# Patient Record
Sex: FEMALE | Race: WHITE | NOT HISPANIC OR LATINO | Employment: FULL TIME | URBAN - NONMETROPOLITAN AREA
[De-identification: names, ages, dates, MRNs, and addresses within clinical notes are randomized per-mention and may not be internally consistent; named-entity substitution may affect disease eponyms.]

---

## 2024-06-06 ENCOUNTER — HOSPITAL ENCOUNTER (EMERGENCY)
Facility: HOSPITAL | Age: 34
Discharge: HOME | End: 2024-06-06
Attending: EMERGENCY MEDICINE
Payer: COMMERCIAL

## 2024-06-06 ENCOUNTER — APPOINTMENT (OUTPATIENT)
Dept: RADIOLOGY | Facility: HOSPITAL | Age: 34
End: 2024-06-06
Payer: COMMERCIAL

## 2024-06-06 ENCOUNTER — HOSPITAL ENCOUNTER (OUTPATIENT)
Dept: CARDIOLOGY | Facility: HOSPITAL | Age: 34
Discharge: HOME | End: 2024-06-06
Payer: COMMERCIAL

## 2024-06-06 VITALS
WEIGHT: 160 LBS | DIASTOLIC BLOOD PRESSURE: 74 MMHG | RESPIRATION RATE: 17 BRPM | BODY MASS INDEX: 22.9 KG/M2 | SYSTOLIC BLOOD PRESSURE: 114 MMHG | HEART RATE: 61 BPM | HEIGHT: 70 IN | OXYGEN SATURATION: 98 % | TEMPERATURE: 97.9 F

## 2024-06-06 DIAGNOSIS — R06.00 DYSPNEA, UNSPECIFIED TYPE: Primary | ICD-10-CM

## 2024-06-06 DIAGNOSIS — Z3A.09 9 WEEKS GESTATION OF PREGNANCY (HHS-HCC): ICD-10-CM

## 2024-06-06 LAB
ALBUMIN SERPL BCP-MCNC: 4.5 G/DL (ref 3.4–5)
ALP SERPL-CCNC: 46 U/L (ref 33–110)
ALT SERPL W P-5'-P-CCNC: 11 U/L (ref 7–45)
ANION GAP SERPL CALC-SCNC: 11 MMOL/L (ref 10–20)
AST SERPL W P-5'-P-CCNC: 11 U/L (ref 9–39)
BASOPHILS # BLD AUTO: 0.04 X10*3/UL (ref 0–0.1)
BASOPHILS NFR BLD AUTO: 0.4 %
BILIRUB SERPL-MCNC: 0.5 MG/DL (ref 0–1.2)
BUN SERPL-MCNC: 13 MG/DL (ref 6–23)
CALCIUM SERPL-MCNC: 9.1 MG/DL (ref 8.6–10.3)
CARDIAC TROPONIN I PNL SERPL HS: <3 NG/L (ref 0–13)
CARDIAC TROPONIN I PNL SERPL HS: <3 NG/L (ref 0–13)
CHLORIDE SERPL-SCNC: 102 MMOL/L (ref 98–107)
CO2 SERPL-SCNC: 25 MMOL/L (ref 21–32)
CREAT SERPL-MCNC: 0.71 MG/DL (ref 0.5–1.05)
D DIMER PPP FEU-MCNC: 1497 NG/ML FEU
EGFRCR SERPLBLD CKD-EPI 2021: >90 ML/MIN/1.73M*2
EOSINOPHIL # BLD AUTO: 0.17 X10*3/UL (ref 0–0.7)
EOSINOPHIL NFR BLD AUTO: 1.8 %
ERYTHROCYTE [DISTWIDTH] IN BLOOD BY AUTOMATED COUNT: 13.2 % (ref 11.5–14.5)
GLUCOSE SERPL-MCNC: 100 MG/DL (ref 74–99)
HCT VFR BLD AUTO: 36.7 % (ref 36–46)
HGB BLD-MCNC: 12.4 G/DL (ref 12–16)
IMM GRANULOCYTES # BLD AUTO: 0.02 X10*3/UL (ref 0–0.7)
IMM GRANULOCYTES NFR BLD AUTO: 0.2 % (ref 0–0.9)
LYMPHOCYTES # BLD AUTO: 2.27 X10*3/UL (ref 1.2–4.8)
LYMPHOCYTES NFR BLD AUTO: 24.2 %
MCH RBC QN AUTO: 29 PG (ref 26–34)
MCHC RBC AUTO-ENTMCNC: 33.8 G/DL (ref 32–36)
MCV RBC AUTO: 86 FL (ref 80–100)
MONOCYTES # BLD AUTO: 0.61 X10*3/UL (ref 0.1–1)
MONOCYTES NFR BLD AUTO: 6.5 %
NEUTROPHILS # BLD AUTO: 6.27 X10*3/UL (ref 1.2–7.7)
NEUTROPHILS NFR BLD AUTO: 66.9 %
NRBC BLD-RTO: 0 /100 WBCS (ref 0–0)
PLATELET # BLD AUTO: 305 X10*3/UL (ref 150–450)
POTASSIUM SERPL-SCNC: 3.5 MMOL/L (ref 3.5–5.3)
PROT SERPL-MCNC: 6.8 G/DL (ref 6.4–8.2)
RBC # BLD AUTO: 4.28 X10*6/UL (ref 4–5.2)
SODIUM SERPL-SCNC: 134 MMOL/L (ref 136–145)
WBC # BLD AUTO: 9.4 X10*3/UL (ref 4.4–11.3)

## 2024-06-06 PROCEDURE — 71275 CT ANGIOGRAPHY CHEST: CPT

## 2024-06-06 PROCEDURE — 36415 COLL VENOUS BLD VENIPUNCTURE: CPT | Performed by: EMERGENCY MEDICINE

## 2024-06-06 PROCEDURE — 80053 COMPREHEN METABOLIC PANEL: CPT | Performed by: EMERGENCY MEDICINE

## 2024-06-06 PROCEDURE — 99285 EMERGENCY DEPT VISIT HI MDM: CPT | Mod: 25

## 2024-06-06 PROCEDURE — 84484 ASSAY OF TROPONIN QUANT: CPT | Performed by: EMERGENCY MEDICINE

## 2024-06-06 PROCEDURE — 2550000001 HC RX 255 CONTRASTS: Performed by: EMERGENCY MEDICINE

## 2024-06-06 PROCEDURE — 71045 X-RAY EXAM CHEST 1 VIEW: CPT

## 2024-06-06 PROCEDURE — 93005 ELECTROCARDIOGRAM TRACING: CPT

## 2024-06-06 PROCEDURE — 36415 COLL VENOUS BLD VENIPUNCTURE: CPT | Performed by: PHYSICIAN ASSISTANT

## 2024-06-06 PROCEDURE — 71275 CT ANGIOGRAPHY CHEST: CPT | Performed by: RADIOLOGY

## 2024-06-06 PROCEDURE — 85379 FIBRIN DEGRADATION QUANT: CPT | Performed by: PHYSICIAN ASSISTANT

## 2024-06-06 PROCEDURE — 85025 COMPLETE CBC W/AUTO DIFF WBC: CPT | Performed by: EMERGENCY MEDICINE

## 2024-06-06 PROCEDURE — 71045 X-RAY EXAM CHEST 1 VIEW: CPT | Performed by: RADIOLOGY

## 2024-06-06 RX ORDER — ACETAMINOPHEN 500 MG
1000 TABLET ORAL EVERY 6 HOURS PRN
COMMUNITY

## 2024-06-06 RX ORDER — ONDANSETRON 4 MG/1
4 TABLET, FILM COATED ORAL EVERY 8 HOURS PRN
COMMUNITY

## 2024-06-06 RX ORDER — FOLIC ACID 0.4 MG
0.4 TABLET ORAL DAILY
COMMUNITY

## 2024-06-06 RX ORDER — CETIRIZINE HYDROCHLORIDE 10 MG/1
10 TABLET ORAL DAILY
COMMUNITY

## 2024-06-06 RX ADMIN — IOHEXOL 68 ML: 350 INJECTION, SOLUTION INTRAVENOUS at 15:26

## 2024-06-06 ASSESSMENT — PAIN DESCRIPTION - ORIENTATION: ORIENTATION: LEFT;UPPER

## 2024-06-06 ASSESSMENT — PAIN DESCRIPTION - PAIN TYPE
TYPE: ACUTE PAIN
TYPE: ACUTE PAIN

## 2024-06-06 ASSESSMENT — PAIN DESCRIPTION - DESCRIPTORS: DESCRIPTORS: PRESSURE

## 2024-06-06 ASSESSMENT — COLUMBIA-SUICIDE SEVERITY RATING SCALE - C-SSRS
2. HAVE YOU ACTUALLY HAD ANY THOUGHTS OF KILLING YOURSELF?: NO
1. IN THE PAST MONTH, HAVE YOU WISHED YOU WERE DEAD OR WISHED YOU COULD GO TO SLEEP AND NOT WAKE UP?: NO
6. HAVE YOU EVER DONE ANYTHING, STARTED TO DO ANYTHING, OR PREPARED TO DO ANYTHING TO END YOUR LIFE?: NO

## 2024-06-06 ASSESSMENT — PAIN SCALES - GENERAL
PAINLEVEL_OUTOF10: 8
PAINLEVEL_OUTOF10: 8

## 2024-06-06 ASSESSMENT — PAIN - FUNCTIONAL ASSESSMENT
PAIN_FUNCTIONAL_ASSESSMENT: 0-10
PAIN_FUNCTIONAL_ASSESSMENT: 0-10

## 2024-06-06 ASSESSMENT — PAIN DESCRIPTION - LOCATION: LOCATION: CHEST

## 2024-06-06 ASSESSMENT — PAIN DESCRIPTION - ONSET: ONSET: ONGOING

## 2024-06-06 ASSESSMENT — PAIN DESCRIPTION - FREQUENCY: FREQUENCY: CONSTANT/CONTINUOUS

## 2024-06-06 NOTE — ED PROVIDER NOTES
HPI   Chief Complaint   Patient presents with    Chest Pain     Patient reports having L arm pain that has kept her up the last 2 evenings. Patient states that she has also been having L side chest pain with nausea and today developed SOB. Denies cough, congestion, fever, V/D or urinary difficulty. Patient is 9 weeks pregnant       Patient presents for chest pain and shortness of breath from her OB's insistence.  Patient is 9 weeks pregnant states a few days ago developed posterior left shoulder pain.  Went to the chiropractor and did get some relief.  She denies any fall or injury.  Then today she noted that the pain came to the front of her chest and she was also becoming short of breath.  She does not have any history of any pregnancy related complications.  She is a  3 para 1 AB 1.  Patient has no history of lung disease or asthma.  She is not a smoker.  She has not noticed any cough or sputum production.  She does not feel that she has been feverish or chilled.      History provided by:  Patient                      No data recorded                     Patient History   History reviewed. No pertinent past medical history.  History reviewed. No pertinent surgical history.  No family history on file.  Social History     Tobacco Use    Smoking status: Never    Smokeless tobacco: Never   Substance Use Topics    Alcohol use: Never    Drug use: Never       Physical Exam   ED Triage Vitals   Temperature Heart Rate Respirations BP   24 1407 24 1407 24 1407 24 1407   36.6 °C (97.9 °F) 72 18 114/74      Pulse Ox Temp Source Heart Rate Source Patient Position   24 1400 24 1556 -- --   100 % Temporal        BP Location FiO2 (%)     -- --             Physical Exam  Vitals and nursing note reviewed.   Constitutional:       General: She is not in acute distress.     Appearance: Normal appearance. She is well-developed, well-groomed and normal weight. She is not ill-appearing or  toxic-appearing.   HENT:      Head: Normocephalic and atraumatic.      Right Ear: External ear normal.      Left Ear: External ear normal.      Nose: Nose normal.      Mouth/Throat:      Mouth: Mucous membranes are moist.   Eyes:      General: No scleral icterus.     Conjunctiva/sclera: Conjunctivae normal.      Pupils: Pupils are equal, round, and reactive to light.   Neck:      Vascular: No JVD.   Cardiovascular:      Rate and Rhythm: Normal rate and regular rhythm.      Pulses:           Dorsalis pedis pulses are 2+ on the right side and 2+ on the left side.        Posterior tibial pulses are 2+ on the right side and 2+ on the left side.      Heart sounds: Normal heart sounds.   Pulmonary:      Effort: Pulmonary effort is normal.      Breath sounds: Normal breath sounds and air entry.      Comments: Despite normal exam patient did appear slightly dyspneic when obtaining history.  She was able to finish her sentence but appears slightly breathless  Abdominal:      General: Bowel sounds are normal.      Palpations: Abdomen is soft.   Musculoskeletal:      Right lower leg: No edema.      Left lower leg: No edema.   Skin:     General: Skin is warm.      Capillary Refill: Capillary refill takes less than 2 seconds.   Neurological:      General: No focal deficit present.      Mental Status: She is alert and oriented to person, place, and time.      Cranial Nerves: No cranial nerve deficit or facial asymmetry.      Sensory: No sensory deficit.      Motor: No weakness.      Gait: Gait normal.   Psychiatric:         Attention and Perception: Attention and perception normal.         Mood and Affect: Affect normal. Mood is anxious.         Speech: Speech normal.         Behavior: Behavior normal. Behavior is cooperative.         Thought Content: Thought content normal.         Cognition and Memory: Cognition and memory normal.         Judgment: Judgment normal.         ED Course & MDM   Diagnoses as of 06/11/24 1043    Dyspnea, unspecified type   9 weeks gestation of pregnancy ([facility])       Medical Decision Making  Patient presents for chest pain and shortness of breath from her OB's insistence.  Patient is 9 weeks pregnant states a few days ago developed posterior left shoulder pain.  Went to the chiropractor and did get some relief.  She denies any fall or injury.  Then today she noted that the pain came to the front of her chest and she was also becoming short of breath.  She does not have any history of any pregnancy related complications.  She is a  3 para 1 AB 1.  Patient has no history of lung disease or asthma.  She is not a smoker.  She has not noticed any cough or sputum production.  She does not feel that she has been feverish or chilled.    Ddx: PE, cardiac, pulmonary, infection, pneumonia, MI, dissection, other    Due to the risk with pregnancy we will start with a cardiac workup including a D-dimer.  D-dimer was slightly elevated therefore CT chest was obtained.  Patient was discussed risk and benefit with attending.  Patient was agreeable to CT of the chest  No acute findings were noted  Patient remained stable throughout ED stay with normal vital signs  Patient encouraged to follow-up with OB/GYN within the next 1 to 2 days.  Return with any worsening symptoms or concerns.  Patient discharged home in improved and stable condition.    The patient was seen by the advanced practice provider.  I have personally performed a substantive portion of the encounter.  I have seen and examined the patient; agree with the workup, evaluation, MDM, management and diagnosis.  The care plan has been discussed.    I personally saw the patient and made/approved the management plan and take responsibility for the patient's management.   History: This 34-year-old white female presented to the ED with complaint of left arm pain and chest pain symptoms with sudden onset of shortness of breath with nausea today she denies any  recent illness, cough, congestion, fever, chills or urinary symptoms.  Patient states that she is 9 weeks pregnant.  She denies any leg pain swelling or tenderness of her calves.  Exam: GEN patient is alert awake oriented appears to be no acute distress nontoxic cooperative for evaluation ENT examinations unremarkable.  Neck is supple without meningismus.  Heart regular rate and rhythm without murmur.  Lungs are clear to auscultation by laterally patient.  Mildly dyspneic during evaluation.  Abdomen is soft benign without rebound rigidity guarding noted skin is warm soft dry intact out rash evaluation lower extremities reveals no peripheral edema negative Homans' sign negative calf tenderness.  MDM: Due to the patient's pregnant status concern for possible pulmonary embolism and/or DVT with the top of the differential diagnosis patient did have a cardiac workup performed and unfortunately the patient is D-dimer is high at 1497.  Subsequently a CT of the chest was performed and this was negative for pulmonary embolism.  The patient was subsidy discharged home in stable satisfactory condition we did have a discussion with the patient concerning her lab results and CTA results prior to discharging her home.  I independently interpreted the following study(s) EKG shows sinus rhythm with no acute ST or T wave changes.   Cyrus Hutson, DO     Amount and/or Complexity of Data Reviewed  Labs: ordered. Decision-making details documented in ED Course.  Radiology: ordered and independent interpretation performed. Decision-making details documented in ED Course.  ECG/medicine tests: ordered and independent interpretation performed. Decision-making details documented in ED Course.     Details: Read by myself and attending showing normal sinus rhythm at a ventricular rate of 60 bpm.  Normal axis.  No ST segment elevation.  SD interval of 132 with a QT of 392    Risk  Diagnosis or treatment significantly limited by social  determinants of health.        Procedure  Procedures     Ladonna Jean Baptiste PA-C  06/06/24 1644       Cyrus Hutson DO  06/11/24 1040

## 2024-06-06 NOTE — DISCHARGE INSTRUCTIONS
Follow-up with your OB as soon as possible to discuss care.  Return with any worsening symptoms or concerns.

## 2024-06-07 LAB
ATRIAL RATE: 60 BPM
P AXIS: 65 DEGREES
P OFFSET: 205 MS
P ONSET: 153 MS
PR INTERVAL: 132 MS
Q ONSET: 219 MS
QRS COUNT: 10 BEATS
QRS DURATION: 88 MS
QT INTERVAL: 392 MS
QTC CALCULATION(BAZETT): 392 MS
QTC FREDERICIA: 392 MS
R AXIS: 74 DEGREES
T AXIS: 69 DEGREES
T OFFSET: 415 MS
VENTRICULAR RATE: 60 BPM

## 2025-08-28 ENCOUNTER — APPOINTMENT (OUTPATIENT)
Dept: PRIMARY CARE | Facility: CLINIC | Age: 35
End: 2025-08-28
Payer: COMMERCIAL

## 2025-08-28 VITALS
BODY MASS INDEX: 23.52 KG/M2 | DIASTOLIC BLOOD PRESSURE: 76 MMHG | SYSTOLIC BLOOD PRESSURE: 128 MMHG | OXYGEN SATURATION: 99 % | WEIGHT: 168 LBS | HEIGHT: 71 IN | HEART RATE: 60 BPM

## 2025-08-28 DIAGNOSIS — Z13.29 SCREENING FOR THYROID DISORDER: Primary | ICD-10-CM

## 2025-08-28 DIAGNOSIS — Z13.220 SCREENING CHOLESTEROL LEVEL: ICD-10-CM

## 2025-08-28 DIAGNOSIS — Z00.00 ADULT WELLNESS VISIT: ICD-10-CM

## 2025-08-28 PROCEDURE — 99385 PREV VISIT NEW AGE 18-39: CPT

## 2025-08-28 PROCEDURE — 1036F TOBACCO NON-USER: CPT

## 2025-08-28 PROCEDURE — 3008F BODY MASS INDEX DOCD: CPT

## 2025-08-28 ASSESSMENT — ENCOUNTER SYMPTOMS
CONSTITUTIONAL NEGATIVE: 1
RESPIRATORY NEGATIVE: 1
CARDIOVASCULAR NEGATIVE: 1
GASTROINTESTINAL NEGATIVE: 1

## 2025-08-28 ASSESSMENT — PATIENT HEALTH QUESTIONNAIRE - PHQ9
SUM OF ALL RESPONSES TO PHQ9 QUESTIONS 1 AND 2: 0
1. LITTLE INTEREST OR PLEASURE IN DOING THINGS: NOT AT ALL
2. FEELING DOWN, DEPRESSED OR HOPELESS: NOT AT ALL

## 2026-08-28 ENCOUNTER — APPOINTMENT (OUTPATIENT)
Dept: PRIMARY CARE | Facility: CLINIC | Age: 36
End: 2026-08-28
Payer: COMMERCIAL